# Patient Record
Sex: FEMALE | Race: WHITE | Employment: UNEMPLOYED | URBAN - METROPOLITAN AREA
[De-identification: names, ages, dates, MRNs, and addresses within clinical notes are randomized per-mention and may not be internally consistent; named-entity substitution may affect disease eponyms.]

---
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
## 2022-09-07 PROCEDURE — 75810000145 HC RMVL FB EAR W/O GEN ANES

## 2022-09-07 PROCEDURE — 99282 EMERGENCY DEPT VISIT SF MDM: CPT

## 2022-09-08 ENCOUNTER — HOSPITAL ENCOUNTER (EMERGENCY)
Age: 22
Discharge: HOME | End: 2022-09-08
Attending: STUDENT IN AN ORGANIZED HEALTH CARE EDUCATION/TRAINING PROGRAM
Payer: MEDICAID

## 2022-09-08 VITALS
RESPIRATION RATE: 20 BRPM | TEMPERATURE: 98.6 F | HEART RATE: 75 BPM | DIASTOLIC BLOOD PRESSURE: 62 MMHG | OXYGEN SATURATION: 100 % | HEIGHT: 66 IN | SYSTOLIC BLOOD PRESSURE: 119 MMHG | WEIGHT: 170 LBS | BODY MASS INDEX: 27.32 KG/M2

## 2022-09-08 DIAGNOSIS — T16.2XXA FOREIGN BODY OF LEFT EAR, INITIAL ENCOUNTER: Primary | ICD-10-CM

## 2022-09-08 NOTE — DISCHARGE INSTRUCTIONS
You presented to the ED with a Q-tip stuck in left ear. Q-tip was removed using alligator forceps. No damage to the ear canal or drum. If clear ear canals without concerning findings.

## 2022-09-08 NOTE — ED PROVIDER NOTES
Patient is a 26-year-old female presented to ED after cleaning ears with Q-tips and a portion of the Q-tip became lodged in the left ear. No pain or bleeding. No attempts to retrieve. The history is provided by the patient. Foreign Body in [facility] current episode started less than 1 hour ago. The foreign body is suspected to be in the left ear. Suspected object: Q-tip. The incident was reported. The incident was witnessed/reported by The patient. Pertinent negatives include no fever and no chest pain. Associated medical issues do not include prior foreign body removal.      History reviewed. No pertinent past medical history. History reviewed. No pertinent surgical history. History reviewed. No pertinent family history. Social History     Socioeconomic History    Marital status: SINGLE     Spouse name: Not on file    Number of children: Not on file    Years of education: Not on file    Highest education level: Not on file   Occupational History    Not on file   Tobacco Use    Smoking status: Never    Smokeless tobacco: Never   Substance and Sexual Activity    Alcohol use: No    Drug use: No    Sexual activity: Not on file   Other Topics Concern    Not on file   Social History Narrative    Not on file     Social Determinants of Health     Financial Resource Strain: Not on file   Food Insecurity: Not on file   Transportation Needs: Not on file   Physical Activity: Not on file   Stress: Not on file   Social Connections: Not on file   Intimate Partner Violence: Not on file   Housing Stability: Not on file         ALLERGIES: Patient has no known allergies. Review of Systems   Constitutional: Negative. Negative for fever. HENT: Negative. Eyes: Negative. Respiratory: Negative. Cardiovascular: Negative. Negative for chest pain. Gastrointestinal: Negative. Endocrine: Negative. Genitourinary: Negative. Musculoskeletal: Negative. Skin: Negative.     Allergic/Immunologic: Negative. Neurological: Negative. Hematological: Negative. Psychiatric/Behavioral: Negative. Vitals:    09/08/22 0008 09/08/22 0010 09/08/22 0015   BP: 119/62     Pulse:   75   Resp: 18  20   Temp: 98.6 °F (37 °C)     SpO2: 100% 100%    Weight: 77.1 kg (170 lb)     Height: 5' 6\" (1.676 m)              Physical Exam  Vitals and nursing note reviewed. Constitutional:       General: She is not in acute distress. Appearance: Normal appearance. HENT:      Head: Normocephalic and atraumatic. Right Ear: External ear normal. No laceration, swelling or tenderness. There is no impacted cerumen. No foreign body. Left Ear: External ear normal. No laceration, swelling or tenderness. There is no impacted cerumen. A foreign body is present. Nose: Nose normal.   Eyes:      Extraocular Movements: Extraocular movements intact. Conjunctiva/sclera: Conjunctivae normal.   Cardiovascular:      Rate and Rhythm: Normal rate. Pulses: Normal pulses. Radial pulses are 2+ on the right side and 2+ on the left side. Heart sounds: Normal heart sounds. Pulmonary:      Effort: Pulmonary effort is normal.      Breath sounds: Normal breath sounds. Chest:      Chest wall: No deformity or tenderness. Abdominal:      General: Abdomen is flat. There is no distension. Tenderness: There is no abdominal tenderness. Musculoskeletal:         General: No deformity or signs of injury. Normal range of motion. Cervical back: Normal range of motion and neck supple. No tenderness. Skin:     General: Skin is warm and dry. Capillary Refill: Capillary refill takes less than 2 seconds. Neurological:      General: No focal deficit present. Mental Status: She is alert and oriented to person, place, and time.    Psychiatric:         Attention and Perception: Attention normal.         Mood and Affect: Mood normal.         Behavior: Behavior normal.        MDM         MEDICATIONS GIVEN:  Medications - No data to display    Differential diagnosis: Foreign body left ear, ear canal trauma, tympanic membrane injury    MDM: Patient is 60-year-old female presented ED with foreign body in left ear found to have portion of Q-tip lodged in the left ear removed with alligator forceps per procedure note below. No signs of injury of the tympanic membrane or ear canal.  Patient stable for discharge    Further personalized recommendations for outpatient care as below. Key discharge instructions and summary of care: You presented to the ED with a Q-tip stuck in left ear. Q-tip was removed using alligator forceps. No damage to the ear canal or drum. If clear ear canals without concerning findings. The patient has been re-evaluated and feeling better. Patient is stable for discharge. All available radiology and laboratory results have been reviewed with patient and/or available family. Patient and/or family verbally conveyed their understanding and agreement of the patient's signs, symptoms, diagnosis, treatment and prognosis and additionally agree to follow-up as recommended in the discharge instructions or to return to the Emergency Department should their condition change or worsen prior to their follow-up appointment. All questions have been answered and patient and/or available family express understanding. IMPRESSION:  1. Foreign body of left ear, initial encounter        DISPOSITION: Discharged    Rodney Vaughn MD      Foreign Body Removal    Date/Time: 9/8/2022 12:10 AM  Performed by: Regina Mercer MD  Authorized by: Regina Mercer MD     Consent:     Consent obtained:  Verbal    Consent given by:  Patient    Risks, benefits, and alternatives were discussed: yes    Location:     Location:  Ear    Ear location:  L ear  Pre-procedure details:     Imaging:  None  Anesthesia:     Anesthesia method:  None  Procedure type:     Procedure complexity:  Simple  Procedure details: Removal mechanism:   Forceps    Foreign bodies recovered:  1    Description:  Tip of Q-tip    Intact foreign body removal: yes    Post-procedure details:     Neurovascular status: intact      Confirmation:  No additional foreign bodies on visualization    Skin closure:  None    Procedure completion:  Tolerated well, no immediate complications

## 2024-05-16 ENCOUNTER — HOSPITAL ENCOUNTER (EMERGENCY)
Facility: HOSPITAL | Age: 24
Discharge: HOME | End: 2024-05-16
Attending: EMERGENCY MEDICINE
Payer: MEDICAID

## 2024-05-16 VITALS
HEART RATE: 66 BPM | WEIGHT: 179 LBS | BODY MASS INDEX: 28.09 KG/M2 | DIASTOLIC BLOOD PRESSURE: 72 MMHG | SYSTOLIC BLOOD PRESSURE: 114 MMHG | RESPIRATION RATE: 16 BRPM | OXYGEN SATURATION: 100 % | HEIGHT: 67 IN | TEMPERATURE: 98.2 F

## 2024-05-16 DIAGNOSIS — B36.0 TINEA VERSICOLOR: Primary | ICD-10-CM

## 2024-05-16 PROCEDURE — 99283 EMERGENCY DEPT VISIT LOW MDM: CPT

## 2024-05-16 RX ORDER — KETOCONAZOLE 20 MG/ML
SHAMPOO TOPICAL DAILY
Qty: 100 ML | Refills: 0 | Status: SHIPPED | OUTPATIENT
Start: 2024-05-16 | End: 2024-05-19

## 2024-05-16 ASSESSMENT — LIFESTYLE VARIABLES
HOW MANY STANDARD DRINKS CONTAINING ALCOHOL DO YOU HAVE ON A TYPICAL DAY: PATIENT DOES NOT DRINK
HOW OFTEN DO YOU HAVE A DRINK CONTAINING ALCOHOL: NEVER

## 2024-05-16 ASSESSMENT — PAIN - FUNCTIONAL ASSESSMENT: PAIN_FUNCTIONAL_ASSESSMENT: NONE - DENIES PAIN

## 2024-05-17 NOTE — ED TRIAGE NOTES
Pt to ED c/o Rash for two weeks that is spreading to bilateral arms and back. Rash appears as small raised dry patches w/ skin intact. Denies pain only itching. No medications taken at home for tx. NAD noted. Vitals stable.

## 2024-05-17 NOTE — ED PROVIDER NOTES
Effort: Pulmonary effort is normal. No respiratory distress.   Musculoskeletal:         General: No deformity. Normal range of motion.   Skin:     Comments: Small circular slightly raised dry/erythematous patches on the bilateral forearms as well as the back.  No vesicular lesions.  No blanching erythema.   Neurological:      Comments: Awake and alert.  GCS 15             EMERGENCY DEPARTMENT COURSE and DIFFERENTIAL DIAGNOSIS/MDM:   Vitals:    Vitals:    05/16/24 2119   Pulse: 66   Resp: 16   Temp: 98.2 °F (36.8 °C)   TempSrc: Oral   SpO2: 100%   Weight: 81.2 kg (179 lb)   Height: 1.702 m (5' 7\")         Medical Decision Making  Risk  Prescription drug management.      23-year-old presenting with the above chief complaint.  Vital signs are stable.  She has small scaly slightly raised patches on the upper extremities and on the back.  I think it is consistent with tinea versicolor.  She will be started on ketoconazole shampoo and was discharged in stable condition      REASSESSMENT          CONSULTS:  None    PROCEDURES:     Procedures          (Please note that portions of this note were completed with a voice recognition program.  Efforts were made to edit the dictations but occasionally words are mis-transcribed.)    Dante Salamanca MD (electronically signed)  Emergency Attending Physician              Dante Salamanca MD  05/17/24 8635

## 2024-11-07 ENCOUNTER — HOSPITAL ENCOUNTER (EMERGENCY)
Facility: HOSPITAL | Age: 24
Discharge: HOME | End: 2024-11-07
Attending: STUDENT IN AN ORGANIZED HEALTH CARE EDUCATION/TRAINING PROGRAM
Payer: MEDICAID

## 2024-11-07 VITALS
BODY MASS INDEX: 29.15 KG/M2 | HEART RATE: 77 BPM | DIASTOLIC BLOOD PRESSURE: 55 MMHG | OXYGEN SATURATION: 99 % | SYSTOLIC BLOOD PRESSURE: 128 MMHG | TEMPERATURE: 98.2 F | WEIGHT: 185.74 LBS | RESPIRATION RATE: 16 BRPM | HEIGHT: 67 IN
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
## 2024-11-07 PROCEDURE — 99282 EMERGENCY DEPT VISIT SF MDM: CPT

## 2024-11-07 ASSESSMENT — PAIN DESCRIPTION - LOCATION: LOCATION: FINGER (COMMENT WHICH ONE)

## 2024-11-07 ASSESSMENT — PAIN SCALES - GENERAL: PAINLEVEL_OUTOF10: 10

## 2024-11-07 ASSESSMENT — PAIN DESCRIPTION - ORIENTATION: ORIENTATION: RIGHT

## 2024-11-07 ASSESSMENT — LIFESTYLE VARIABLES
HOW OFTEN DO YOU HAVE A DRINK CONTAINING ALCOHOL: NEVER
HOW MANY STANDARD DRINKS CONTAINING ALCOHOL DO YOU HAVE ON A TYPICAL DAY: PATIENT DOES NOT DRINK

## 2024-11-07 ASSESSMENT — PAIN DESCRIPTION - DESCRIPTORS: DESCRIPTORS: THROBBING

## 2024-11-07 NOTE — ED TRIAGE NOTES
Pt arrives to ER w/ c/o ring stuck on her 4th digit of her right hand. Finger is swollen, slightly red. She states she has some sensation.

## 2024-11-07 NOTE — ED NOTES
ED received from Gabriela AGUILAR. At this time, electric ring cutter on  due to ring cutter not holding charge. Pt resting hand on top of ice pack.

## 2024-11-07 NOTE — ED NOTES
[employer] at bedside to assist in ring removal utilizing their ring cutters. Pt tolerating well.

## 2024-11-07 NOTE — ED PROVIDER NOTES
[facility] [place]  EMERGENCY DEPARTMENT ENCOUNTER      Pt Name: Susanna Madden  MRN: [id number]  Birthdate 2000  Date of evaluation: 11/7/2024  Provider: Terri Saha DO    CHIEF COMPLAINT       Chief Complaint   Patient presents with    Finger Pain    Ring Removal         HISTORY OF PRESENT ILLNESS    HPI    Susanna Madden is a 24 y.o. female who presents to the emergency department for ring stuck on finger. Patient reports last night attempting to remove a ring from her right fourth digit but unable to get it off.  When she woke up this morning finger was significantly swollen and painful.  Denies any trauma or injury to the finger prior to her trying to remove the ring.    Nursing Notes were reviewed.    REVIEW OF SYSTEMS       Review of Systems   Constitutional:  Negative for fever.   Musculoskeletal:  Positive for arthralgias.           PAST MEDICAL HISTORY   No past medical history on file.      SURGICAL HISTORY     No past surgical history on file.      CURRENT MEDICATIONS       There are no discharge medications for this patient.      ALLERGIES     Patient has no known allergies.    FAMILY HISTORY     No family history on file.       SOCIAL HISTORY       Social History     Socioeconomic History    Marital status: Single   Tobacco Use    Smoking status: Never    Smokeless tobacco: Never   Substance and Sexual Activity    Alcohol use: No    Drug use: No           PHYSICAL EXAM       ED Triage Vitals [11/07/24 0643]   BP Systolic BP Percentile Diastolic BP Percentile Temp Temp Source Pulse Respirations SpO2   123/64 -- -- 98.2 °F (36.8 °C) Oral 77 16 99 %      Height Weight - Scale         1.689 m (5' 6.5\") 84.2 kg (185 lb 11.8 oz)             Body mass index is 29.53 kg/m².    Physical Exam  Vitals and nursing note reviewed.   Constitutional:       General: She is not in acute distress.     Appearance: Normal appearance. She is not ill-appearing or toxic-appearing.   HENT:      Head:  Normocephalic and atraumatic.   Cardiovascular:      Rate and Rhythm: Normal rate.      Pulses: Normal pulses.   Pulmonary:      Effort: Pulmonary effort is normal. No respiratory distress.   Musculoskeletal:         General: Normal range of motion.      Cervical back: Normal range of motion.   Skin:     Capillary Refill: Capillary refill takes less than 2 seconds.      Comments: +swelling, erythema of the right 4th digit with ring on finger    Neurological:      General: No focal deficit present.      Mental Status: She is alert.   Psychiatric:         Mood and Affect: Mood normal.         Behavior: Behavior normal.         DIAGNOSTIC RESULTS     EKG: All EKG's are interpreted by the Emergency Department Physician who either signs or Co-signs this chart in the absence of a cardiologist.         RADIOLOGY:   Non-plain film images such as CT, Ultrasound and MRI are read by the radiologist. Plain radiographic images are visualized and preliminarily interpreted by the emergency physician with the below findings:        Interpretation per the Radiologist below, if available at the time of this note:    No orders to display        LABS:  Labs Reviewed - No data to display    All other labs were within normal range or not returned as of this dictation.    EMERGENCY DEPARTMENT COURSE and DIFFERENTIAL DIAGNOSIS/MDM:   Vitals:    Vitals:    11/07/24 0643 11/07/24 0833   BP: 123/64 (!) 128/55   Pulse: 77    Resp: 16 16   Temp: 98.2 °F (36.8 °C)    TempSrc: Oral    SpO2: 99%    Weight: 84.2 kg (185 lb 11.8 oz)    Height: 1.689 m (5' 6.5\")            Medical Decision Making      DECISION MAKING:  Susanna Madden is a 24 y.o. female who comes in as above.  Patient afebrile vital signs stable.  On my examination, she is uncomfortable appearing, on examination of the right ring finger, there is swelling, erythema and discomfort.    Delay in ring removal due to ring cutter at our facility being broken.  Subsequently able to have fire

## 2024-11-07 NOTE — ED NOTES
Ring successfully removed. Skin intact. Pt tearful, this RN comforting patient. MD informed of ring removal.

## 2024-11-07 NOTE — ED NOTES
Electric ring cutter remains on  at this time. MD aware of delay r/t  malfunction. Pt denies numbness/tingling of RUE 4th digit.

## 2024-11-07 NOTE — ED NOTES
Ice pack x 2 applied to right hand on top and below  Finger is very swollen and purple/blue color to entire finger, red  Applied ultrasound gel and attempted to push ring back down the finger just slightly to alleviate swelling    0700-ring cutter to bedside, battery is dead  Attempted manual ring cutter by ed tech no success